# Patient Record
Sex: FEMALE | Race: WHITE | NOT HISPANIC OR LATINO | Employment: OTHER | URBAN - METROPOLITAN AREA
[De-identification: names, ages, dates, MRNs, and addresses within clinical notes are randomized per-mention and may not be internally consistent; named-entity substitution may affect disease eponyms.]

---

## 2023-02-08 PROBLEM — R31.9 HEMATURIA: Status: ACTIVE | Noted: 2023-02-08

## 2023-02-08 PROBLEM — Z30.40 CONTRACEPTIVE USE: Status: ACTIVE | Noted: 2023-02-08

## 2023-02-08 PROBLEM — B00.9 HSV-1 (HERPES SIMPLEX VIRUS 1) INFECTION: Status: RESOLVED | Noted: 2023-02-08 | Resolved: 2023-02-08

## 2023-02-08 PROBLEM — E78.5 DYSLIPIDEMIA: Status: ACTIVE | Noted: 2023-02-08

## 2023-02-08 PROBLEM — E05.00 GRAVES DISEASE: Status: ACTIVE | Noted: 2023-02-08

## 2023-02-08 PROBLEM — J01.00 ACUTE MAXILLARY SINUSITIS: Status: RESOLVED | Noted: 2023-02-08 | Resolved: 2023-02-08

## 2023-02-08 RX ORDER — VALACYCLOVIR HYDROCHLORIDE 1 G/1
1 TABLET, FILM COATED ORAL
COMMUNITY
Start: 2022-01-05 | End: 2024-02-09 | Stop reason: SDUPTHER

## 2023-02-08 RX ORDER — FLUCONAZOLE 150 MG/1
1 TABLET ORAL
COMMUNITY
Start: 2022-01-05

## 2023-04-14 LAB
ALANINE AMINOTRANSFERASE (SGPT) (U/L) IN SER/PLAS: 9 U/L (ref 7–45)
ALBUMIN (G/DL) IN SER/PLAS: 4.1 G/DL (ref 3.4–5)
ALKALINE PHOSPHATASE (U/L) IN SER/PLAS: 29 U/L (ref 33–110)
ANION GAP IN SER/PLAS: 12 MMOL/L (ref 10–20)
APPEARANCE, URINE: CLEAR
ASPARTATE AMINOTRANSFERASE (SGOT) (U/L) IN SER/PLAS: 11 U/L (ref 9–39)
BILIRUBIN TOTAL (MG/DL) IN SER/PLAS: 0.7 MG/DL (ref 0–1.2)
BILIRUBIN, URINE: NEGATIVE
BLOOD, URINE: ABNORMAL
CALCIDIOL (25 OH VITAMIN D3) (NG/ML) IN SER/PLAS: 26 NG/ML
CALCIUM (MG/DL) IN SER/PLAS: 8.9 MG/DL (ref 8.6–10.3)
CARBON DIOXIDE, TOTAL (MMOL/L) IN SER/PLAS: 26 MMOL/L (ref 21–32)
CHLORIDE (MMOL/L) IN SER/PLAS: 104 MMOL/L (ref 98–107)
CHOLESTEROL (MG/DL) IN SER/PLAS: 269 MG/DL (ref 0–199)
CHOLESTEROL IN HDL (MG/DL) IN SER/PLAS: 69.2 MG/DL
CHOLESTEROL/HDL RATIO: 3.9
COLOR, URINE: YELLOW
CREATININE (MG/DL) IN SER/PLAS: 0.52 MG/DL (ref 0.5–1.05)
ERYTHROCYTE DISTRIBUTION WIDTH (RATIO) BY AUTOMATED COUNT: 13.5 % (ref 11.5–14.5)
ERYTHROCYTE MEAN CORPUSCULAR HEMOGLOBIN CONCENTRATION (G/DL) BY AUTOMATED: 32.7 G/DL (ref 32–36)
ERYTHROCYTE MEAN CORPUSCULAR VOLUME (FL) BY AUTOMATED COUNT: 95 FL (ref 80–100)
ERYTHROCYTES (10*6/UL) IN BLOOD BY AUTOMATED COUNT: 4.04 X10E12/L (ref 4–5.2)
ESTIMATED AVERAGE GLUCOSE FOR HBA1C: 100 MG/DL
GFR FEMALE: >90 ML/MIN/1.73M2
GLUCOSE (MG/DL) IN SER/PLAS: 79 MG/DL (ref 74–99)
GLUCOSE, URINE: NEGATIVE MG/DL
HEMATOCRIT (%) IN BLOOD BY AUTOMATED COUNT: 38.5 % (ref 36–46)
HEMOGLOBIN (G/DL) IN BLOOD: 12.6 G/DL (ref 12–16)
HEMOGLOBIN A1C/HEMOGLOBIN TOTAL IN BLOOD: 5.1 %
KETONES, URINE: ABNORMAL MG/DL
LDL: 181 MG/DL (ref 0–99)
LEUKOCYTE ESTERASE, URINE: NEGATIVE
LEUKOCYTES (10*3/UL) IN BLOOD BY AUTOMATED COUNT: 8.1 X10E9/L (ref 4.4–11.3)
MUCUS, URINE: ABNORMAL /LPF
NITRITE, URINE: NEGATIVE
PH, URINE: 5.5 (ref 5–8)
PLATELETS (10*3/UL) IN BLOOD AUTOMATED COUNT: 388 X10E9/L (ref 150–450)
POTASSIUM (MMOL/L) IN SER/PLAS: 3.9 MMOL/L (ref 3.5–5.3)
PROTEIN TOTAL: 6.5 G/DL (ref 6.4–8.2)
PROTEIN, URINE: NEGATIVE MG/DL
RBC, URINE: >182 /HPF (ref 0–5)
SODIUM (MMOL/L) IN SER/PLAS: 138 MMOL/L (ref 136–145)
SPECIFIC GRAVITY, URINE: ABNORMAL (ref 1–1.03)
SQUAMOUS EPITHELIAL CELLS, URINE: 1 /HPF
THYROTROPIN (MIU/L) IN SER/PLAS BY DETECTION LIMIT <= 0.05 MIU/L: 1.41 MIU/L (ref 0.44–3.98)
TRIGLYCERIDE (MG/DL) IN SER/PLAS: 95 MG/DL (ref 0–149)
UREA NITROGEN (MG/DL) IN SER/PLAS: 11 MG/DL (ref 6–23)
UROBILINOGEN, URINE: <2 MG/DL (ref 0–1.9)
VLDL: 19 MG/DL (ref 0–40)
WBC, URINE: 2 /HPF (ref 0–5)

## 2023-04-21 ENCOUNTER — OFFICE VISIT (OUTPATIENT)
Dept: PRIMARY CARE | Facility: CLINIC | Age: 43
End: 2023-04-21
Payer: COMMERCIAL

## 2023-04-21 VITALS
HEART RATE: 96 BPM | OXYGEN SATURATION: 99 % | TEMPERATURE: 96.6 F | SYSTOLIC BLOOD PRESSURE: 122 MMHG | DIASTOLIC BLOOD PRESSURE: 80 MMHG | HEIGHT: 62 IN | BODY MASS INDEX: 21.9 KG/M2 | WEIGHT: 119 LBS

## 2023-04-21 DIAGNOSIS — Z00.00 ROUTINE GENERAL MEDICAL EXAMINATION AT A HEALTH CARE FACILITY: Primary | ICD-10-CM

## 2023-04-21 DIAGNOSIS — Z12.39 SCREENING BREAST EXAMINATION: ICD-10-CM

## 2023-04-21 DIAGNOSIS — E78.5 DYSLIPIDEMIA: ICD-10-CM

## 2023-04-21 PROCEDURE — 99396 PREV VISIT EST AGE 40-64: CPT | Performed by: FAMILY MEDICINE

## 2023-04-21 RX ORDER — TOBRAMYCIN 3 MG/ML
SOLUTION/ DROPS OPHTHALMIC
COMMUNITY
Start: 2023-02-21 | End: 2023-04-21 | Stop reason: ALTCHOICE

## 2023-04-21 RX ORDER — FERROUS SULFATE, DRIED 160(50) MG
1 TABLET, EXTENDED RELEASE ORAL DAILY
COMMUNITY

## 2023-04-21 ASSESSMENT — PATIENT HEALTH QUESTIONNAIRE - PHQ9
1. LITTLE INTEREST OR PLEASURE IN DOING THINGS: NOT AT ALL
2. FEELING DOWN, DEPRESSED OR HOPELESS: NOT AT ALL
SUM OF ALL RESPONSES TO PHQ9 QUESTIONS 1 AND 2: 0

## 2023-04-21 ASSESSMENT — ENCOUNTER SYMPTOMS
COUGH: 0
DYSPHORIC MOOD: 0
SHORTNESS OF BREATH: 0
BRUISES/BLEEDS EASILY: 0
JOINT SWELLING: 0
PALPITATIONS: 0
TREMORS: 0
VOMITING: 0
HEMATURIA: 0
UNEXPECTED WEIGHT CHANGE: 0
FATIGUE: 0
WEAKNESS: 0
NAUSEA: 0
ABDOMINAL PAIN: 0
BLOOD IN STOOL: 0
DIARRHEA: 0
DYSURIA: 0
FEVER: 0
LIGHT-HEADEDNESS: 0
TROUBLE SWALLOWING: 0
SINUS PAIN: 0

## 2023-04-21 ASSESSMENT — PAIN SCALES - GENERAL: PAINLEVEL: 0-NO PAIN

## 2023-04-21 NOTE — PATIENT INSTRUCTIONS
It was nice to see you today!  Discussed current concerns and addressed   Reviewed recent labs and diagnostics  Reviewed medications list  Continue to eat a healthy diet, exercise at least 3 times a week or more  Plan and follow up discussed

## 2023-04-21 NOTE — PROGRESS NOTES
Subjective   Patient ID: Debra Tang is a 42 y.o. female.      Patient comes in today for physical exam.  There has been no chest pain, shortness of breath, fever, chills, unexplained weight loss, rectal bleeding or any other unusual symptoms.  Recent labs, diagnostics and pertinent information has been reviewed. Patient is attempting to eat a healthy diet and incorporate exercise in to their lifestyle. Patient smoker, 1/2 pack daily. Alcohol in moderation. Patient is practicing routine eye and dental care. Reviewed employment status. No uncontrolled anxiety, depression. Reviewed current medications, if any.  LDL in 180's, diet poor.  Gyn current.        Review of Systems   Constitutional:  Negative for fatigue, fever and unexpected weight change.   HENT:  Negative for congestion, ear pain, nosebleeds, sinus pain and trouble swallowing.    Eyes:  Negative for visual disturbance.   Respiratory:  Negative for cough and shortness of breath.    Cardiovascular:  Negative for chest pain and palpitations.   Gastrointestinal:  Negative for abdominal pain, blood in stool, diarrhea, nausea and vomiting.   Genitourinary:  Negative for dysuria and hematuria.   Musculoskeletal:  Negative for gait problem and joint swelling.   Neurological:  Negative for tremors, weakness and light-headedness.   Hematological:  Does not bruise/bleed easily.   Psychiatric/Behavioral:  Negative for dysphoric mood and suicidal ideas.      Vitals:    04/21/23 0946   BP: 122/80   Pulse: 96   Temp: 35.9 °C (96.6 °F)   SpO2: 99%      Objective   Physical Exam  Constitutional:       Appearance: Normal appearance.   HENT:      Head: Normocephalic and atraumatic.      Right Ear: Tympanic membrane and external ear normal.      Left Ear: Tympanic membrane and external ear normal.      Nose: Nose normal.      Mouth/Throat:      Mouth: Mucous membranes are moist.      Pharynx: Oropharynx is clear. No oropharyngeal exudate.   Eyes:      Extraocular  Movements: Extraocular movements intact.      Conjunctiva/sclera: Conjunctivae normal.      Pupils: Pupils are equal, round, and reactive to light.   Cardiovascular:      Rate and Rhythm: Normal rate and regular rhythm.      Heart sounds: Normal heart sounds.   Pulmonary:      Effort: Pulmonary effort is normal.      Breath sounds: Normal breath sounds.   Abdominal:      General: Abdomen is flat.      Palpations: Abdomen is soft. There is no mass.      Tenderness: There is no abdominal tenderness. There is no guarding.   Musculoskeletal:      Cervical back: Neck supple.   Lymphadenopathy:      Cervical: No cervical adenopathy.   Skin:     General: Skin is warm and dry.   Neurological:      General: No focal deficit present.      Mental Status: She is alert.   Psychiatric:         Mood and Affect: Mood normal.         Speech: Speech normal.         Behavior: Behavior normal.         Cognition and Memory: Cognition normal.         Assessment/Plan   There are no diagnoses linked to this encounter.

## 2023-10-20 ENCOUNTER — LAB (OUTPATIENT)
Dept: LAB | Facility: LAB | Age: 43
End: 2023-10-20
Payer: COMMERCIAL

## 2023-10-20 DIAGNOSIS — E78.5 DYSLIPIDEMIA: ICD-10-CM

## 2023-10-20 LAB
CHOLEST SERPL-MCNC: 250 MG/DL (ref 0–199)
CHOLESTEROL/HDL RATIO: 3.6
HDLC SERPL-MCNC: 68.9 MG/DL
LDLC SERPL CALC-MCNC: 159 MG/DL
NON HDL CHOLESTEROL: 181 MG/DL (ref 0–149)
TRIGL SERPL-MCNC: 110 MG/DL (ref 0–149)
VLDL: 22 MG/DL (ref 0–40)

## 2023-10-20 PROCEDURE — 80061 LIPID PANEL: CPT

## 2023-10-20 PROCEDURE — 36415 COLL VENOUS BLD VENIPUNCTURE: CPT

## 2023-10-26 PROBLEM — E05.90 HYPERTHYROIDISM: Status: ACTIVE | Noted: 2023-10-26

## 2023-10-26 PROBLEM — E78.5 HYPERLIPIDEMIA: Status: ACTIVE | Noted: 2023-10-26

## 2023-10-26 RX ORDER — METHIMAZOLE 5 MG/1
5 TABLET ORAL
COMMUNITY
Start: 2016-04-15

## 2023-10-27 ENCOUNTER — OFFICE VISIT (OUTPATIENT)
Dept: PRIMARY CARE | Facility: CLINIC | Age: 43
End: 2023-10-27
Payer: COMMERCIAL

## 2023-10-27 VITALS
OXYGEN SATURATION: 99 % | WEIGHT: 118 LBS | BODY MASS INDEX: 23.16 KG/M2 | TEMPERATURE: 97.1 F | SYSTOLIC BLOOD PRESSURE: 120 MMHG | DIASTOLIC BLOOD PRESSURE: 80 MMHG | HEIGHT: 60 IN | HEART RATE: 74 BPM

## 2023-10-27 DIAGNOSIS — E78.5 DYSLIPIDEMIA: Primary | ICD-10-CM

## 2023-10-27 PROCEDURE — 99213 OFFICE O/P EST LOW 20 MIN: CPT | Performed by: FAMILY MEDICINE

## 2023-10-27 RX ORDER — ROSUVASTATIN CALCIUM 10 MG/1
10 TABLET, COATED ORAL DAILY
Qty: 90 TABLET | Refills: 1 | Status: SHIPPED | OUTPATIENT
Start: 2023-10-27 | End: 2024-05-01

## 2023-10-27 ASSESSMENT — ENCOUNTER SYMPTOMS
TROUBLE SWALLOWING: 0
ABDOMINAL PAIN: 0
SORE THROAT: 0
EYE PAIN: 0
JOINT SWELLING: 0
PALPITATIONS: 0
FREQUENCY: 0
FATIGUE: 0
UNEXPECTED WEIGHT CHANGE: 0
COUGH: 0
HALLUCINATIONS: 0
NUMBNESS: 0
DYSURIA: 0
DECREASED CONCENTRATION: 0
NAUSEA: 0
VOMITING: 0
CONFUSION: 0
WEAKNESS: 0
FEVER: 0
DIARRHEA: 0
BLOOD IN STOOL: 0
SHORTNESS OF BREATH: 0
HEADACHES: 0
DIZZINESS: 0
HEMATURIA: 0

## 2023-10-27 ASSESSMENT — PAIN SCALES - GENERAL: PAINLEVEL: 0-NO PAIN

## 2023-10-27 ASSESSMENT — PATIENT HEALTH QUESTIONNAIRE - PHQ9
2. FEELING DOWN, DEPRESSED OR HOPELESS: NOT AT ALL
SUM OF ALL RESPONSES TO PHQ9 QUESTIONS 1 AND 2: 0
1. LITTLE INTEREST OR PLEASURE IN DOING THINGS: NOT AT ALL

## 2023-10-27 NOTE — PROGRESS NOTES
Subjective   Patient ID: Debra Tang is a 43 y.o. female.    Patient comes in today to go over labs.  She has dyslipidemia.  HDL is up at 68.9 but LDL is also up at 159.  It is familial.  She is a smoker.  She has tried watching diet.        Review of Systems   Constitutional:  Negative for fatigue, fever and unexpected weight change.   HENT:  Negative for congestion, ear pain, hearing loss, sore throat and trouble swallowing.    Eyes:  Negative for pain and visual disturbance.   Respiratory:  Negative for cough and shortness of breath.    Cardiovascular:  Negative for chest pain, palpitations and leg swelling.   Gastrointestinal:  Negative for abdominal pain, blood in stool, diarrhea, nausea and vomiting.   Genitourinary:  Negative for dysuria, frequency, hematuria and urgency.   Musculoskeletal:  Negative for joint swelling.   Skin:  Negative for pallor and rash.   Neurological:  Negative for dizziness, syncope, weakness, numbness and headaches.   Psychiatric/Behavioral:  Negative for confusion, decreased concentration, hallucinations and suicidal ideas.      Vitals:    10/27/23 0918   BP: 120/80   Pulse: 74   Temp: 36.2 °C (97.1 °F)   SpO2: 99%      Objective   Physical Exam  Constitutional:       Appearance: Normal appearance.   Cardiovascular:      Rate and Rhythm: Normal rate and regular rhythm.      Heart sounds: Normal heart sounds.   Pulmonary:      Effort: Pulmonary effort is normal.      Breath sounds: Normal breath sounds.   Musculoskeletal:      Cervical back: Neck supple.   Skin:     General: Skin is warm and dry.   Neurological:      General: No focal deficit present.      Mental Status: She is alert.   Psychiatric:         Mood and Affect: Mood normal.         Speech: Speech normal.         Behavior: Behavior normal.         Cognition and Memory: Cognition normal.         Assessment/Plan   Diagnoses and all orders for this visit:  Dyslipidemia  -     Lipid panel; Future  -     Hepatic function  panel; Future  -     rosuvastatin (Crestor) 10 mg tablet; Take 1 tablet (10 mg) by mouth once daily.

## 2023-10-27 NOTE — PATIENT INSTRUCTIONS
It was nice to see you today!  Discussed current concerns and addressed   Reviewed recent labs and diagnostics  Reviewed medications list  Continue to eat a healthy diet, exercise at least 3 times a week or more  Plan and follow up discussed  For any further information related to your condition, copy and paste or go to [email or web address]  Start rosuvastatin  Recheck labs 3 mos  Lower animal fats in diet

## 2024-02-02 ENCOUNTER — LAB (OUTPATIENT)
Dept: LAB | Facility: LAB | Age: 44
End: 2024-02-02
Payer: COMMERCIAL

## 2024-02-02 DIAGNOSIS — E78.5 DYSLIPIDEMIA: ICD-10-CM

## 2024-02-02 LAB
ALBUMIN SERPL BCP-MCNC: 4.3 G/DL (ref 3.4–5)
ALP SERPL-CCNC: 30 U/L (ref 33–110)
ALT SERPL W P-5'-P-CCNC: 9 U/L (ref 7–45)
AST SERPL W P-5'-P-CCNC: 12 U/L (ref 9–39)
BILIRUB DIRECT SERPL-MCNC: 0.1 MG/DL (ref 0–0.3)
BILIRUB SERPL-MCNC: 0.6 MG/DL (ref 0–1.2)
CHOLEST SERPL-MCNC: 161 MG/DL (ref 0–199)
CHOLESTEROL/HDL RATIO: 2.5
HDLC SERPL-MCNC: 63.9 MG/DL
LDLC SERPL CALC-MCNC: 83 MG/DL
NON HDL CHOLESTEROL: 97 MG/DL (ref 0–149)
PROT SERPL-MCNC: 6.4 G/DL (ref 6.4–8.2)
TRIGL SERPL-MCNC: 70 MG/DL (ref 0–149)
VLDL: 14 MG/DL (ref 0–40)

## 2024-02-02 PROCEDURE — 80061 LIPID PANEL: CPT

## 2024-02-02 PROCEDURE — 80076 HEPATIC FUNCTION PANEL: CPT

## 2024-02-02 PROCEDURE — 36415 COLL VENOUS BLD VENIPUNCTURE: CPT

## 2024-02-09 ENCOUNTER — OFFICE VISIT (OUTPATIENT)
Dept: PRIMARY CARE | Facility: CLINIC | Age: 44
End: 2024-02-09
Payer: COMMERCIAL

## 2024-02-09 VITALS
WEIGHT: 120 LBS | DIASTOLIC BLOOD PRESSURE: 60 MMHG | HEIGHT: 60 IN | TEMPERATURE: 96.2 F | SYSTOLIC BLOOD PRESSURE: 100 MMHG | OXYGEN SATURATION: 97 % | BODY MASS INDEX: 23.56 KG/M2 | HEART RATE: 57 BPM

## 2024-02-09 DIAGNOSIS — E78.5 DYSLIPIDEMIA: ICD-10-CM

## 2024-02-09 DIAGNOSIS — R21 RASH: Primary | ICD-10-CM

## 2024-02-09 DIAGNOSIS — A60.9 HSV (HERPES SIMPLEX VIRUS) ANOGENITAL INFECTION: ICD-10-CM

## 2024-02-09 PROCEDURE — 99213 OFFICE O/P EST LOW 20 MIN: CPT | Performed by: FAMILY MEDICINE

## 2024-02-09 RX ORDER — MOMETASONE FUROATE 1 MG/G
OINTMENT TOPICAL DAILY
Qty: 45 G | Refills: 3 | Status: SHIPPED | OUTPATIENT
Start: 2024-02-09 | End: 2025-02-08

## 2024-02-09 RX ORDER — VALACYCLOVIR HYDROCHLORIDE 1 G/1
TABLET, FILM COATED ORAL
Qty: 16 TABLET | Refills: 1 | Status: SHIPPED | OUTPATIENT
Start: 2024-02-09

## 2024-02-09 ASSESSMENT — ENCOUNTER SYMPTOMS
NUMBNESS: 0
SHORTNESS OF BREATH: 0
EYE PAIN: 0
NAUSEA: 0
PALPITATIONS: 0
DIZZINESS: 0
FREQUENCY: 0
SORE THROAT: 0
VOMITING: 0
BLOOD IN STOOL: 0
HEMATURIA: 0
FATIGUE: 0
UNEXPECTED WEIGHT CHANGE: 0
ABDOMINAL PAIN: 0
COUGH: 0
DYSURIA: 0
DECREASED CONCENTRATION: 0
FEVER: 0
HEADACHES: 0
CONFUSION: 0
DIARRHEA: 0
HALLUCINATIONS: 0
WEAKNESS: 0
JOINT SWELLING: 0
TROUBLE SWALLOWING: 0

## 2024-02-09 ASSESSMENT — PAIN SCALES - GENERAL: PAINLEVEL: 0-NO PAIN

## 2024-02-09 NOTE — PROGRESS NOTES
Subjective   Patient ID: Debra Tang is a 43 y.o. female.    Patient comes in today for follow-up on labs.  She had an LDL in October of 159 and previous to that in the 180s.  There is a family history of cardiovascular disease.  We started her on rosuvastatin 10 mg a day and her HDL is down to 83.  There is no side effect from the medicine.  She does have a pruritic rash on the right hip that are basically not painful, there were no vesicles ever but it itches quite a bit.  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        Review of Systems   Constitutional:  Negative for fatigue, fever and unexpected weight change.   HENT:  Negative for congestion, ear pain, hearing loss, sore throat and trouble swallowing.    Eyes:  Negative for pain and visual disturbance.   Respiratory:  Negative for cough and shortness of breath.    Cardiovascular:  Negative for chest pain, palpitations and leg swelling.   Gastrointestinal:  Negative for abdominal pain, blood in stool, diarrhea, nausea and vomiting.   Genitourinary:  Negative for dysuria, frequency, hematuria and urgency.   Musculoskeletal:  Negative for joint swelling.   Skin:  Positive for rash. Negative for pallor.   Neurological:  Negative for dizziness, syncope, weakness, numbness and headaches.   Psychiatric/Behavioral:  Negative for confusion, decreased concentration, hallucinations and suicidal ideas.      Vitals:    02/09/24 0919   BP: 100/60   Pulse: 57   Temp: 35.7 °C (96.2 °F)   SpO2: 97%      Objective   Physical Exam  Constitutional:       Appearance: Normal appearance.   Cardiovascular:      Rate and Rhythm: Normal rate and regular rhythm.      Heart sounds: Normal heart sounds.   Pulmonary:      Effort: Pulmonary effort is normal.      Breath sounds: Normal breath sounds.   Musculoskeletal:      Cervical back: Neck supple.   Skin:     General: Skin is warm and dry.      Comments: Right hip area and the patch about 7 x 7 cm there are red  slightly raised lesions about 5 to 10 mm in diameter that are not really coalesced, there are scabs on them but no vesicles.  She states she has been scratching.   Neurological:      General: No focal deficit present.      Mental Status: She is alert.   Psychiatric:         Mood and Affect: Mood normal.         Speech: Speech normal.         Behavior: Behavior normal.         Cognition and Memory: Cognition normal.         Assessment/Plan   Diagnoses and all orders for this visit:  Rash  -     mometasone (Elocon) 0.1 % ointment; Apply topically once daily. apply to affected area bid prn  HSV (herpes simplex virus) anogenital infection  -     valACYclovir (Valtrex) 1 gram tablet; 2 tabs every 12 hours for 2 doses, 4 pills total  Dyslipidemia

## 2024-02-09 NOTE — PATIENT INSTRUCTIONS
It was nice to see you today!  Discussed current concerns and addressed   Reviewed recent labs and diagnostics  Reviewed medications list  Continue to eat a healthy diet, exercise at least 3 times a week or more  Plan and follow up discussed  For any further information related to your condition, copy and paste or go to [email or web address]    Rash could be shingles but doubtful because there was no burning pain and there were no vesicles.  It does look suspicious for poison oak but there is no contact.  Most likely just a reaction to something.  I will give her steroid cream.  Refill valacyclovir for cold sores and her cholesterol is doing great and we will check with her next physical.

## 2024-04-30 DIAGNOSIS — E78.5 DYSLIPIDEMIA: ICD-10-CM

## 2024-05-01 RX ORDER — ROSUVASTATIN CALCIUM 10 MG/1
10 TABLET, COATED ORAL DAILY
Qty: 90 TABLET | Refills: 1 | Status: SHIPPED | OUTPATIENT
Start: 2024-05-01

## 2024-06-14 ENCOUNTER — LAB (OUTPATIENT)
Dept: LAB | Facility: LAB | Age: 44
End: 2024-06-14
Payer: COMMERCIAL

## 2024-06-14 DIAGNOSIS — Z00.00 ANNUAL PHYSICAL EXAM: ICD-10-CM

## 2024-06-14 LAB
25(OH)D3 SERPL-MCNC: 70 NG/ML (ref 30–100)
ALBUMIN SERPL BCP-MCNC: 4.6 G/DL (ref 3.4–5)
ALP SERPL-CCNC: 31 U/L (ref 33–110)
ALT SERPL W P-5'-P-CCNC: 13 U/L (ref 7–45)
ANION GAP SERPL CALC-SCNC: 13 MMOL/L (ref 10–20)
APPEARANCE UR: CLEAR
AST SERPL W P-5'-P-CCNC: 15 U/L (ref 9–39)
BASOPHILS # BLD AUTO: 0.06 X10*3/UL (ref 0–0.1)
BASOPHILS NFR BLD AUTO: 0.7 %
BILIRUB SERPL-MCNC: 0.7 MG/DL (ref 0–1.2)
BILIRUB UR STRIP.AUTO-MCNC: NEGATIVE MG/DL
BUN SERPL-MCNC: 11 MG/DL (ref 6–23)
CALCIUM SERPL-MCNC: 9.1 MG/DL (ref 8.6–10.3)
CHLORIDE SERPL-SCNC: 103 MMOL/L (ref 98–107)
CHOLEST SERPL-MCNC: 212 MG/DL (ref 0–199)
CHOLESTEROL/HDL RATIO: 2.8
CO2 SERPL-SCNC: 24 MMOL/L (ref 21–32)
COLOR UR: COLORLESS
CREAT SERPL-MCNC: 0.57 MG/DL (ref 0.5–1.05)
EGFRCR SERPLBLD CKD-EPI 2021: >90 ML/MIN/1.73M*2
EOSINOPHIL # BLD AUTO: 0.29 X10*3/UL (ref 0–0.7)
EOSINOPHIL NFR BLD AUTO: 3.4 %
ERYTHROCYTE [DISTWIDTH] IN BLOOD BY AUTOMATED COUNT: 13.7 % (ref 11.5–14.5)
EST. AVERAGE GLUCOSE BLD GHB EST-MCNC: 97 MG/DL
GLUCOSE SERPL-MCNC: 73 MG/DL (ref 74–99)
GLUCOSE UR STRIP.AUTO-MCNC: NORMAL MG/DL
HBA1C MFR BLD: 5 %
HCT VFR BLD AUTO: 39.4 % (ref 36–46)
HDLC SERPL-MCNC: 74.9 MG/DL
HGB BLD-MCNC: 13.1 G/DL (ref 12–16)
IMM GRANULOCYTES # BLD AUTO: 0.03 X10*3/UL (ref 0–0.7)
IMM GRANULOCYTES NFR BLD AUTO: 0.4 % (ref 0–0.9)
KETONES UR STRIP.AUTO-MCNC: NEGATIVE MG/DL
LDLC SERPL CALC-MCNC: 118 MG/DL
LEUKOCYTE ESTERASE UR QL STRIP.AUTO: NEGATIVE
LYMPHOCYTES # BLD AUTO: 3.06 X10*3/UL (ref 1.2–4.8)
LYMPHOCYTES NFR BLD AUTO: 36.1 %
MCH RBC QN AUTO: 31.6 PG (ref 26–34)
MCHC RBC AUTO-ENTMCNC: 33.2 G/DL (ref 32–36)
MCV RBC AUTO: 95 FL (ref 80–100)
MONOCYTES # BLD AUTO: 0.85 X10*3/UL (ref 0.1–1)
MONOCYTES NFR BLD AUTO: 10 %
NEUTROPHILS # BLD AUTO: 4.18 X10*3/UL (ref 1.2–7.7)
NEUTROPHILS NFR BLD AUTO: 49.4 %
NITRITE UR QL STRIP.AUTO: NEGATIVE
NON HDL CHOLESTEROL: 137 MG/DL (ref 0–149)
NRBC BLD-RTO: 0 /100 WBCS (ref 0–0)
PH UR STRIP.AUTO: 6.5 [PH]
PLATELET # BLD AUTO: 382 X10*3/UL (ref 150–450)
POTASSIUM SERPL-SCNC: 3.9 MMOL/L (ref 3.5–5.3)
PROT SERPL-MCNC: 6.8 G/DL (ref 6.4–8.2)
PROT UR STRIP.AUTO-MCNC: NEGATIVE MG/DL
RBC # BLD AUTO: 4.14 X10*6/UL (ref 4–5.2)
RBC # UR STRIP.AUTO: NEGATIVE /UL
SODIUM SERPL-SCNC: 136 MMOL/L (ref 136–145)
SP GR UR STRIP.AUTO: 1
TRIGL SERPL-MCNC: 95 MG/DL (ref 0–149)
TSH SERPL-ACNC: 2.27 MIU/L (ref 0.44–3.98)
UROBILINOGEN UR STRIP.AUTO-MCNC: NORMAL MG/DL
VLDL: 19 MG/DL (ref 0–40)
WBC # BLD AUTO: 8.5 X10*3/UL (ref 4.4–11.3)

## 2024-06-14 PROCEDURE — 85025 COMPLETE CBC W/AUTO DIFF WBC: CPT

## 2024-06-14 PROCEDURE — 84443 ASSAY THYROID STIM HORMONE: CPT

## 2024-06-14 PROCEDURE — 36415 COLL VENOUS BLD VENIPUNCTURE: CPT

## 2024-06-14 PROCEDURE — 82306 VITAMIN D 25 HYDROXY: CPT

## 2024-06-14 PROCEDURE — 83036 HEMOGLOBIN GLYCOSYLATED A1C: CPT

## 2024-06-14 PROCEDURE — 81003 URINALYSIS AUTO W/O SCOPE: CPT

## 2024-06-14 PROCEDURE — 80061 LIPID PANEL: CPT

## 2024-06-14 PROCEDURE — 80053 COMPREHEN METABOLIC PANEL: CPT

## 2024-06-20 ENCOUNTER — APPOINTMENT (OUTPATIENT)
Dept: PRIMARY CARE | Facility: CLINIC | Age: 44
End: 2024-06-20
Payer: COMMERCIAL

## 2024-06-20 VITALS
HEIGHT: 60 IN | WEIGHT: 119 LBS | OXYGEN SATURATION: 100 % | DIASTOLIC BLOOD PRESSURE: 60 MMHG | SYSTOLIC BLOOD PRESSURE: 100 MMHG | HEART RATE: 85 BPM | TEMPERATURE: 97.6 F | RESPIRATION RATE: 16 BRPM | BODY MASS INDEX: 23.36 KG/M2

## 2024-06-20 DIAGNOSIS — Z12.31 BREAST CANCER SCREENING BY MAMMOGRAM: ICD-10-CM

## 2024-06-20 DIAGNOSIS — Z00.00 ANNUAL PHYSICAL EXAM: Primary | ICD-10-CM

## 2024-06-20 PROCEDURE — 4004F PT TOBACCO SCREEN RCVD TLK: CPT | Performed by: FAMILY MEDICINE

## 2024-06-20 PROCEDURE — 99396 PREV VISIT EST AGE 40-64: CPT | Performed by: FAMILY MEDICINE

## 2024-06-20 ASSESSMENT — ENCOUNTER SYMPTOMS
HALLUCINATIONS: 0
NUMBNESS: 0
HEADACHES: 0
NAUSEA: 0
DEPRESSION: 0
UNEXPECTED WEIGHT CHANGE: 0
DIZZINESS: 0
EYE PAIN: 0
JOINT SWELLING: 0
SHORTNESS OF BREATH: 0
DYSURIA: 0
ABDOMINAL PAIN: 0
DIARRHEA: 0
TROUBLE SWALLOWING: 0
FEVER: 0
CONFUSION: 0
DECREASED CONCENTRATION: 0
FREQUENCY: 0
BLOOD IN STOOL: 0
HEMATURIA: 0
PALPITATIONS: 0
WEAKNESS: 0
SORE THROAT: 0
VOMITING: 0
LOSS OF SENSATION IN FEET: 0
OCCASIONAL FEELINGS OF UNSTEADINESS: 0
COUGH: 0
FATIGUE: 0

## 2024-06-20 NOTE — PATIENT INSTRUCTIONS
It was nice to see you today!  Discussed current concerns and addressed   Reviewed recent labs and diagnostics  Reviewed medications list  Continue to eat a healthy diet, exercise at least 3 times a week or more  Plan and follow up discussed  For any further information related to your condition, copy and paste or go to [email or web address]  Smoking cessation discussed.

## 2024-06-20 NOTE — PROGRESS NOTES
Subjective   Patient ID: Debra Tang is a 43 y.o. female.    Debra Tang comes in today for physical exam.  There has been no chest pain, shortness of breath, fever, chills, unexplained weight loss, rectal bleeding or any other unusual symptoms.  Recent labs, diagnostics and pertinent information has been reviewed. Cholesterol good on crestor.  Patient is attempting to eat a healthy diet and incorporate exercise in to their lifestyle. Patient is smoker for about 20 years.  Alcohol in moderation. Patient is practicing routine eye and dental care. Reviewed employment status. No uncontrolled anxiety, depression. Reviewed current medications, if any.          Review of Systems   Constitutional:  Negative for fatigue, fever and unexpected weight change.   HENT:  Negative for congestion, ear pain, hearing loss, sore throat and trouble swallowing.    Eyes:  Negative for pain and visual disturbance.   Respiratory:  Negative for cough and shortness of breath.    Cardiovascular:  Negative for chest pain, palpitations and leg swelling.   Gastrointestinal:  Negative for abdominal pain, blood in stool, diarrhea, nausea and vomiting.   Genitourinary:  Negative for dysuria, frequency, hematuria and urgency.   Musculoskeletal:  Negative for joint swelling.   Skin:  Negative for pallor and rash.   Neurological:  Negative for dizziness, syncope, weakness, numbness and headaches.   Psychiatric/Behavioral:  Negative for confusion, decreased concentration, hallucinations and suicidal ideas.      Vitals:    06/20/24 1353   BP: 100/60   Pulse: 85   Resp: 16   Temp: 36.4 °C (97.6 °F)   SpO2: 100%      Objective   Physical Exam  Constitutional:       Appearance: Normal appearance.   HENT:      Head: Normocephalic and atraumatic.      Right Ear: Tympanic membrane and external ear normal.      Left Ear: Tympanic membrane and external ear normal.      Nose: Nose normal.      Mouth/Throat:      Mouth: Mucous membranes are moist.       Pharynx: Oropharynx is clear. No oropharyngeal exudate.   Eyes:      Extraocular Movements: Extraocular movements intact.      Conjunctiva/sclera: Conjunctivae normal.      Pupils: Pupils are equal, round, and reactive to light.   Cardiovascular:      Rate and Rhythm: Normal rate and regular rhythm.      Heart sounds: Normal heart sounds.   Pulmonary:      Effort: Pulmonary effort is normal.      Breath sounds: Normal breath sounds.   Abdominal:      General: Abdomen is flat.      Palpations: Abdomen is soft. There is no mass.      Tenderness: There is no abdominal tenderness. There is no guarding.   Musculoskeletal:      Cervical back: Neck supple.   Lymphadenopathy:      Cervical: No cervical adenopathy.   Skin:     General: Skin is warm and dry.   Neurological:      General: No focal deficit present.      Mental Status: She is alert.   Psychiatric:         Mood and Affect: Mood normal.         Speech: Speech normal.         Behavior: Behavior normal.         Cognition and Memory: Cognition normal.         Assessment/Plan   Diagnoses and all orders for this visit:  Annual physical exam  -     CBC and Auto Differential; Future  -     Comprehensive Metabolic Panel; Future  -     Hemoglobin A1C; Future  -     Lipid Panel; Future  -     Thyroid Stimulating Hormone; Future  -     Urinalysis with Reflex Microscopic; Future  -     Vitamin D 25-Hydroxy,Total (for eval of Vitamin D levels); Future

## 2024-06-21 ENCOUNTER — APPOINTMENT (OUTPATIENT)
Dept: PRIMARY CARE | Facility: CLINIC | Age: 44
End: 2024-06-21
Payer: COMMERCIAL

## 2024-07-19 ENCOUNTER — HOSPITAL ENCOUNTER (OUTPATIENT)
Dept: RADIOLOGY | Facility: HOSPITAL | Age: 44
Discharge: HOME | End: 2024-07-19
Payer: COMMERCIAL

## 2024-07-19 VITALS — HEIGHT: 60 IN | WEIGHT: 119 LBS | BODY MASS INDEX: 23.36 KG/M2

## 2024-07-19 DIAGNOSIS — Z12.31 BREAST CANCER SCREENING BY MAMMOGRAM: ICD-10-CM

## 2024-07-19 PROCEDURE — 77067 SCR MAMMO BI INCL CAD: CPT

## 2024-07-19 PROCEDURE — 77063 BREAST TOMOSYNTHESIS BI: CPT | Performed by: RADIOLOGY

## 2024-07-19 PROCEDURE — 77067 SCR MAMMO BI INCL CAD: CPT | Performed by: RADIOLOGY

## 2025-06-20 ENCOUNTER — APPOINTMENT (OUTPATIENT)
Dept: PRIMARY CARE | Facility: CLINIC | Age: 45
End: 2025-06-20
Payer: COMMERCIAL

## 2025-07-21 ENCOUNTER — TELEPHONE (OUTPATIENT)
Dept: PRIMARY CARE | Facility: CLINIC | Age: 45
End: 2025-07-21
Payer: COMMERCIAL

## 2025-07-21 DIAGNOSIS — Z00.00 ANNUAL PHYSICAL EXAM: ICD-10-CM

## 2025-07-26 LAB
25(OH)D3+25(OH)D2 SERPL-MCNC: 29 NG/ML (ref 30–100)
ALBUMIN SERPL-MCNC: 4.6 G/DL (ref 3.6–5.1)
ALP SERPL-CCNC: 31 U/L (ref 31–125)
ALT SERPL-CCNC: 9 U/L (ref 6–29)
ANION GAP SERPL CALCULATED.4IONS-SCNC: 11 MMOL/L (CALC) (ref 7–17)
APPEARANCE UR: CLEAR
AST SERPL-CCNC: 13 U/L (ref 10–30)
BACTERIA #/AREA URNS HPF: ABNORMAL /HPF
BASOPHILS # BLD AUTO: 75 CELLS/UL (ref 0–200)
BASOPHILS NFR BLD AUTO: 0.7 %
BILIRUB SERPL-MCNC: 0.6 MG/DL (ref 0.2–1.2)
BILIRUB UR QL STRIP: NEGATIVE
BUN SERPL-MCNC: 9 MG/DL (ref 7–25)
CALCIUM SERPL-MCNC: 9.4 MG/DL (ref 8.6–10.2)
CHLORIDE SERPL-SCNC: 102 MMOL/L (ref 98–110)
CHOLEST SERPL-MCNC: 320 MG/DL
CHOLEST/HDLC SERPL: 4.1 (CALC)
CO2 SERPL-SCNC: 25 MMOL/L (ref 20–32)
COLOR UR: YELLOW
CREAT SERPL-MCNC: 0.57 MG/DL (ref 0.5–0.99)
EGFRCR SERPLBLD CKD-EPI 2021: 116 ML/MIN/1.73M2
EOSINOPHIL # BLD AUTO: 246 CELLS/UL (ref 15–500)
EOSINOPHIL NFR BLD AUTO: 2.3 %
ERYTHROCYTE [DISTWIDTH] IN BLOOD BY AUTOMATED COUNT: 13.2 % (ref 11–15)
EST. AVERAGE GLUCOSE BLD GHB EST-MCNC: 103 MG/DL
EST. AVERAGE GLUCOSE BLD GHB EST-SCNC: 5.7 MMOL/L
GLUCOSE SERPL-MCNC: 83 MG/DL (ref 65–99)
GLUCOSE UR QL STRIP: NEGATIVE
HBA1C MFR BLD: 5.2 %
HCT VFR BLD AUTO: 41.4 % (ref 35–45)
HDLC SERPL-MCNC: 78 MG/DL
HGB BLD-MCNC: 13.8 G/DL (ref 11.7–15.5)
HGB UR QL STRIP: NEGATIVE
HYALINE CASTS #/AREA URNS LPF: ABNORMAL /LPF
KETONES UR QL STRIP: NEGATIVE
LDLC SERPL CALC-MCNC: 213 MG/DL (CALC)
LEUKOCYTE ESTERASE UR QL STRIP: ABNORMAL
LYMPHOCYTES # BLD AUTO: 3050 CELLS/UL (ref 850–3900)
LYMPHOCYTES NFR BLD AUTO: 28.5 %
MCH RBC QN AUTO: 31.9 PG (ref 27–33)
MCHC RBC AUTO-ENTMCNC: 33.3 G/DL (ref 32–36)
MCV RBC AUTO: 95.8 FL (ref 80–100)
MONOCYTES # BLD AUTO: 674 CELLS/UL (ref 200–950)
MONOCYTES NFR BLD AUTO: 6.3 %
NEUTROPHILS # BLD AUTO: 6655 CELLS/UL (ref 1500–7800)
NEUTROPHILS NFR BLD AUTO: 62.2 %
NITRITE UR QL STRIP: NEGATIVE
NONHDLC SERPL-MCNC: 242 MG/DL (CALC)
PH UR STRIP: 7.5 [PH] (ref 5–8)
PLATELET # BLD AUTO: 410 THOUSAND/UL (ref 140–400)
PMV BLD REES-ECKER: 8.7 FL (ref 7.5–12.5)
POTASSIUM SERPL-SCNC: 4.4 MMOL/L (ref 3.5–5.3)
PROT SERPL-MCNC: 6.8 G/DL (ref 6.1–8.1)
PROT UR QL STRIP: NEGATIVE
RBC # BLD AUTO: 4.32 MILLION/UL (ref 3.8–5.1)
RBC #/AREA URNS HPF: ABNORMAL /HPF
SERVICE CMNT-IMP: ABNORMAL
SODIUM SERPL-SCNC: 138 MMOL/L (ref 135–146)
SP GR UR STRIP: 1 (ref 1–1.03)
SQUAMOUS #/AREA URNS HPF: ABNORMAL /HPF
TRIGL SERPL-MCNC: 134 MG/DL
TSH SERPL-ACNC: 2.62 MIU/L
WBC # BLD AUTO: 10.7 THOUSAND/UL (ref 3.8–10.8)
WBC #/AREA URNS HPF: ABNORMAL /HPF

## 2025-08-01 ENCOUNTER — APPOINTMENT (OUTPATIENT)
Dept: PRIMARY CARE | Facility: CLINIC | Age: 45
End: 2025-08-01
Payer: COMMERCIAL

## 2025-08-01 ENCOUNTER — TELEPHONE (OUTPATIENT)
Dept: PRIMARY CARE | Facility: CLINIC | Age: 45
End: 2025-08-01

## 2025-08-01 VITALS
TEMPERATURE: 98.1 F | OXYGEN SATURATION: 98 % | WEIGHT: 122 LBS | DIASTOLIC BLOOD PRESSURE: 72 MMHG | SYSTOLIC BLOOD PRESSURE: 116 MMHG | HEART RATE: 95 BPM | BODY MASS INDEX: 23.95 KG/M2 | HEIGHT: 60 IN

## 2025-08-01 DIAGNOSIS — E78.5 DYSLIPIDEMIA: ICD-10-CM

## 2025-08-01 DIAGNOSIS — Z12.31 BREAST CANCER SCREENING BY MAMMOGRAM: ICD-10-CM

## 2025-08-01 DIAGNOSIS — Z00.00 ROUTINE GENERAL MEDICAL EXAMINATION AT A HEALTH CARE FACILITY: Primary | ICD-10-CM

## 2025-08-01 PROCEDURE — 99396 PREV VISIT EST AGE 40-64: CPT | Performed by: FAMILY MEDICINE

## 2025-08-01 PROCEDURE — 3008F BODY MASS INDEX DOCD: CPT | Performed by: FAMILY MEDICINE

## 2025-08-01 RX ORDER — ROSUVASTATIN CALCIUM 10 MG/1
10 TABLET, COATED ORAL DAILY
Qty: 90 TABLET | Refills: 1 | Status: SHIPPED | OUTPATIENT
Start: 2025-08-01

## 2025-08-01 ASSESSMENT — ENCOUNTER SYMPTOMS
TROUBLE SWALLOWING: 0
FATIGUE: 0
FEVER: 0
ARTHRALGIAS: 1
UNEXPECTED WEIGHT CHANGE: 0
JOINT SWELLING: 0
BRUISES/BLEEDS EASILY: 0
COUGH: 0
HEMATURIA: 0
PALPITATIONS: 0
DYSURIA: 0
BLOOD IN STOOL: 0
TREMORS: 0
SHORTNESS OF BREATH: 0
ABDOMINAL PAIN: 0
DIARRHEA: 0
WEAKNESS: 0
VOMITING: 0
NAUSEA: 0
SINUS PAIN: 0
LIGHT-HEADEDNESS: 0
DYSPHORIC MOOD: 0

## 2025-08-01 NOTE — PATIENT INSTRUCTIONS
It was nice to see you today!  Discussed current concerns and addressed   Reviewed recent labs and diagnostics  Reviewed medications list  Continue to eat a healthy diet, exercise at least 3 times a week or more  Plan and follow up discussed  For any further information related to your condition, copy and paste or go to [email or web address]  Restart statin  Rotator cuff exercises printed for patient.

## 2025-08-01 NOTE — PROGRESS NOTES
Subjective   Patient ID: Debra Tang is a 45 y.o. female.    Debra Tang comes in today for physical exam.  There has been no chest pain, shortness of breath, fever, chills, unexplained weight loss, rectal bleeding or any other unusual symptoms. Body mass index is 23.83 kg/m².  Recent labs, diagnostics and pertinent information has been reviewed.  She has an LDL over 200.  She stopped the statin because she thought her left shoulder pain was resulting from it.  It is painful with abduction and internal rotation.  She is a  for employment.  Discussed that is most likely rotator cuff issues.  Patient is attempting to eat a healthy diet and incorporate exercise in to their lifestyle. +Smoker. Alcohol in moderation. Patient is practicing routine eye and dental care. Reviewed employment status. No uncontrolled anxiety, depression. Reviewed current medications, if any.          Review of Systems   Constitutional:  Negative for fatigue, fever and unexpected weight change.   HENT:  Negative for congestion, ear pain, nosebleeds, sinus pain and trouble swallowing.    Eyes:  Negative for visual disturbance.   Respiratory:  Negative for cough and shortness of breath.    Cardiovascular:  Negative for chest pain and palpitations.   Gastrointestinal:  Negative for abdominal pain, blood in stool, diarrhea, nausea and vomiting.   Genitourinary:  Negative for dysuria and hematuria.   Musculoskeletal:  Positive for arthralgias. Negative for gait problem and joint swelling.   Neurological:  Negative for tremors, weakness and light-headedness.   Hematological:  Does not bruise/bleed easily.   Psychiatric/Behavioral:  Negative for dysphoric mood and suicidal ideas.      Vitals:    08/01/25 1432   BP: 116/72   Pulse: 95   Temp: 36.7 °C (98.1 °F)   SpO2: 98%      Body mass index is 23.83 kg/m².  Objective   Physical Exam  Constitutional:       Appearance: Normal appearance.   HENT:      Head: Normocephalic and atraumatic.       Right Ear: Tympanic membrane and external ear normal.      Left Ear: Tympanic membrane and external ear normal.      Nose: Nose normal.      Mouth/Throat:      Mouth: Mucous membranes are moist.      Pharynx: Oropharynx is clear. No oropharyngeal exudate.     Eyes:      Extraocular Movements: Extraocular movements intact.      Conjunctiva/sclera: Conjunctivae normal.      Pupils: Pupils are equal, round, and reactive to light.       Cardiovascular:      Rate and Rhythm: Normal rate and regular rhythm.      Heart sounds: Normal heart sounds.   Pulmonary:      Effort: Pulmonary effort is normal.      Breath sounds: Normal breath sounds.   Abdominal:      General: Abdomen is flat.      Palpations: Abdomen is soft. There is no mass.      Tenderness: There is no abdominal tenderness. There is no guarding.     Musculoskeletal:      Cervical back: Neck supple.      Comments: Impingement sign left shoulder, limited internal rotation with pain.   Lymphadenopathy:      Cervical: No cervical adenopathy.     Skin:     General: Skin is warm and dry.     Neurological:      General: No focal deficit present.      Mental Status: She is alert.     Psychiatric:         Mood and Affect: Mood normal.         Speech: Speech normal.         Behavior: Behavior normal.         Cognition and Memory: Cognition normal.         Last Labs:     CMP:   Lab Results   Component Value Date    CALCIUM 9.4 07/25/2025    CALCIUM 9.1 06/14/2024    CALCIUM 8.9 04/14/2023    CALCIUM 9.1 04/05/2022    PROT 6.8 07/25/2025    PROT 6.8 06/14/2024    PROT 6.4 02/02/2024    ALBUMIN 4.6 07/25/2025    ALBUMIN 4.6 06/14/2024    ALBUMIN 4.3 02/02/2024    AST 13 07/25/2025    AST 15 06/14/2024    AST 12 02/02/2024    ALKPHOS 31 07/25/2025    ALKPHOS 31 (L) 06/14/2024    ALKPHOS 30 (L) 02/02/2024    BILITOT 0.6 07/25/2025    BILITOT 0.7 06/14/2024    BILITOT 0.6 02/02/2024     CBC:   Lab Results   Component Value Date    WBC 10.7 07/25/2025    WBC 8.5 06/14/2024  "   WBC 8.1 04/14/2023    WBC 9.2 04/05/2022    HGB 13.8 07/25/2025    HGB 13.1 06/14/2024    HGB 12.6 04/14/2023    HGB 12.7 04/05/2022    HCT 41.4 07/25/2025    HCT 39.4 06/14/2024    HCT 38.5 04/14/2023    HCT 36.7 04/05/2022    MCV 95.8 07/25/2025    MCV 95 06/14/2024    MCV 95 04/14/2023    MCV 91 04/05/2022     (H) 07/25/2025     06/14/2024     04/14/2023     04/05/2022     A1C:   Lab Results   Component Value Date    HGBA1C 5.2 07/25/2025    HGBA1C 5.0 06/14/2024    HGBA1C 5.1 04/14/2023     LIPID PANEL:   Lab Results   Component Value Date    CHOL 320 (H) 07/25/2025    CHOL 212 (H) 06/14/2024    CHOL 161 02/02/2024    TRIG 134 07/25/2025    TRIG 95 06/14/2024    TRIG 70 02/02/2024    HDL 78 07/25/2025    HDL 74.9 06/14/2024    HDL 63.9 02/02/2024    CHHDL 4.1 07/25/2025    CHHDL 2.8 06/14/2024    CHHDL 2.5 02/02/2024    LDLF 181 (H) 04/14/2023    LDLF 152 (H) 01/07/2022    VLDL 19 06/14/2024    VLDL 14 02/02/2024    NHDL 242 (H) 07/25/2025    NHDL 137 06/14/2024    NHDL 97 02/02/2024     TSH:   Lab Results   Component Value Date    TSH 2.62 07/25/2025    TSH 2.27 06/14/2024    TSH 1.41 04/14/2023    TSH 1.26 01/07/2022     PSA:   No results found for: \"PSA\"     Assessment/Plan   There are no diagnoses linked to this encounter.    "

## 2025-08-07 ENCOUNTER — APPOINTMENT (OUTPATIENT)
Dept: RADIOLOGY | Facility: HOSPITAL | Age: 45
End: 2025-08-07
Payer: COMMERCIAL

## 2025-08-07 VITALS — HEIGHT: 60 IN | WEIGHT: 122 LBS | BODY MASS INDEX: 23.95 KG/M2

## 2025-08-07 DIAGNOSIS — Z12.31 BREAST CANCER SCREENING BY MAMMOGRAM: ICD-10-CM

## 2025-08-07 PROCEDURE — 77063 BREAST TOMOSYNTHESIS BI: CPT | Performed by: RADIOLOGY

## 2025-08-07 PROCEDURE — 77067 SCR MAMMO BI INCL CAD: CPT | Performed by: RADIOLOGY

## 2025-08-07 PROCEDURE — 77067 SCR MAMMO BI INCL CAD: CPT

## 2025-09-15 ENCOUNTER — APPOINTMENT (OUTPATIENT)
Facility: CLINIC | Age: 45
End: 2025-09-15
Payer: COMMERCIAL